# Patient Record
Sex: FEMALE | URBAN - METROPOLITAN AREA
[De-identification: names, ages, dates, MRNs, and addresses within clinical notes are randomized per-mention and may not be internally consistent; named-entity substitution may affect disease eponyms.]

---

## 2022-03-20 PROBLEM — Z34.90 PREGNANCY: Status: ACTIVE | Noted: 2021-06-08

## 2023-12-06 ENCOUNTER — TELEPHONE (OUTPATIENT)
Facility: CLINIC | Age: 23
End: 2023-12-06

## 2023-12-06 NOTE — TELEPHONE ENCOUNTER
Left voicemail to call back if she wanted to schedule appt    ----- Message from Chantel Adler sent at 12/5/2023 12:25 PM EST -----  Subject: Appointment Request    Reason for Call: New Patient/New to Provider Appointment needed: New   Patient Request Appointment    QUESTIONS    Reason for appointment request? No appointments available during search     Additional Information for Provider? patient would like to establish care   with any provider at location, patient was in ER on 12/4 and told has a   thyroid nodule and needs a referral to get a bioposy  ---------------------------------------------------------------------------  --------------  [address]  [phone]; OK to leave message on voicemail  ---------------------------------------------------------------------------  --------------  SCRIPT ANSWERS

## 2024-01-26 ENCOUNTER — TELEPHONE (OUTPATIENT)
Age: 24
End: 2024-01-26

## 2024-01-26 ENCOUNTER — OFFICE VISIT (OUTPATIENT)
Age: 24
End: 2024-01-26
Payer: COMMERCIAL

## 2024-01-26 VITALS
DIASTOLIC BLOOD PRESSURE: 72 MMHG | OXYGEN SATURATION: 98 % | SYSTOLIC BLOOD PRESSURE: 124 MMHG | HEART RATE: 124 BPM | RESPIRATION RATE: 16 BRPM

## 2024-01-26 DIAGNOSIS — E04.1 THYROID NODULE: Primary | ICD-10-CM

## 2024-01-26 DIAGNOSIS — E04.1 THYROID NODULE: ICD-10-CM

## 2024-01-26 DIAGNOSIS — E04.9 THYROID ENLARGEMENT: ICD-10-CM

## 2024-01-26 PROCEDURE — 99203 OFFICE O/P NEW LOW 30 MIN: CPT | Performed by: STUDENT IN AN ORGANIZED HEALTH CARE EDUCATION/TRAINING PROGRAM

## 2024-01-26 NOTE — PROGRESS NOTES
Subjective:   Makenna Banks   24 y.o.   2000     Refered by: No referring provider defined for this encounter.     New Patient Visit  Chief Compliant: thyroid mass    History of Present Illness:  Makenna Banks is a 24 y.o. female with no reported past medical history, who presents today for evaluation of thyroid mass.     Endorsed dysphagia and shortness of breath, now resolved.  Went to the ED for evaluation, work-up was negative and she was discharged.  Has family history of thyroid disease.  All women on father side had some issue with thyroid gland, some of them requiring total thyroidectomy.    Review of Systems  Consitutional: denies fever, excessive weight gain or loss.  Eyes: denies diplopia, eye pain.  Integumentary: denies new concerning skin lesions.  Ears, Nose, Mouth, Throat: denies except as per HPI.  Endocrine: denies hot or cold intolerance, increased thirst.  Respiratory: denies cough, hemoptysis, wheezing  Gastrointestinal: denies trouble swallowing, nausea, emesis, regurgitation  Musculoskeletal: denies muscle weakness or wasting  Cardiovascular: denies chest pain, shortness of breath  Neurologic: denies seizures, numbness or tingling, syncope  Hematologic: denies easy bleeding or bruising       History reviewed. No pertinent past medical history.  Past Surgical History:   Procedure Laterality Date     SECTION      TONSILLECTOMY        History reviewed. No pertinent family history.  Social History     Tobacco Use    Smoking status: Never    Smokeless tobacco: Never   Substance Use Topics    Alcohol use: Never      Prior to Admission medications    Not on File        No Known Allergies      Objective:     /72   Pulse (!) 124   Resp 16   SpO2 98%      Physical Exam:   General: Comfortable, pleasant, appears stated age   Voice: Strong, speaking in full sentences, no stridor    Face: No masses or lesions, facial strength symmetric   Ears: External ears unremarkable. Bilateral

## 2024-02-23 ENCOUNTER — HOSPITAL ENCOUNTER (OUTPATIENT)
Facility: HOSPITAL | Age: 24
Discharge: HOME | End: 2024-02-23
Attending: STUDENT IN AN ORGANIZED HEALTH CARE EDUCATION/TRAINING PROGRAM
Payer: COMMERCIAL

## 2024-02-23 DIAGNOSIS — E04.9 THYROID ENLARGEMENT: ICD-10-CM

## 2024-02-23 DIAGNOSIS — E04.1 THYROID NODULE: ICD-10-CM

## 2024-02-23 PROCEDURE — 76536 US EXAM OF HEAD AND NECK: CPT

## 2024-02-28 ENCOUNTER — TELEPHONE (OUTPATIENT)
Age: 24
End: 2024-02-28

## 2024-02-28 NOTE — TELEPHONE ENCOUNTER
----- Message from Usman Darnell MD sent at 2/28/2024 12:58 PM EST -----  Can you please let the patient know that the ultrasound shows a pretty sizable thyroid nodule, but not one that is concerning for a cancer. We can discuss next steps at the follow up visit

## 2024-03-05 ENCOUNTER — TELEPHONE (OUTPATIENT)
Age: 24
End: 2024-03-05

## 2025-04-21 ENCOUNTER — OFFICE VISIT (OUTPATIENT)
Age: 25
End: 2025-04-21
Payer: COMMERCIAL

## 2025-04-21 VITALS
WEIGHT: 290 LBS | SYSTOLIC BLOOD PRESSURE: 128 MMHG | DIASTOLIC BLOOD PRESSURE: 88 MMHG | HEIGHT: 63 IN | RESPIRATION RATE: 18 BRPM | OXYGEN SATURATION: 99 % | BODY MASS INDEX: 51.38 KG/M2 | HEART RATE: 96 BPM

## 2025-04-21 DIAGNOSIS — E04.1 THYROID NODULE: ICD-10-CM

## 2025-04-21 DIAGNOSIS — E04.9 THYROID GOITER: ICD-10-CM

## 2025-04-21 DIAGNOSIS — E04.9 THYROID GOITER: Primary | ICD-10-CM

## 2025-04-21 DIAGNOSIS — R13.10 DYSPHAGIA, UNSPECIFIED TYPE: ICD-10-CM

## 2025-04-21 PROCEDURE — 99214 OFFICE O/P EST MOD 30 MIN: CPT | Performed by: STUDENT IN AN ORGANIZED HEALTH CARE EDUCATION/TRAINING PROGRAM

## 2025-04-21 PROCEDURE — 31575 DIAGNOSTIC LARYNGOSCOPY: CPT | Performed by: STUDENT IN AN ORGANIZED HEALTH CARE EDUCATION/TRAINING PROGRAM

## 2025-04-21 RX ORDER — MEDROXYPROGESTERONE ACETATE 150 MG/ML
INJECTION, SUSPENSION INTRAMUSCULAR
COMMUNITY
Start: 2025-03-22

## 2025-04-21 NOTE — PROGRESS NOTES
Subjective:   Makenna Banks   25 y.o.   2000     Refered by: No referring provider defined for this encounter.     New Patient Visit  Chief Compliant: thyroid mass    History of Present Illness:  Makenna Banks is a 25 y.o. female with no reported past medical history, who presents today for evaluation of thyroid mass.     Endorsed dysphagia and shortness of breath, now resolved.  Went to the ED for evaluation, work-up was negative and she was discharged.  Has family history of thyroid disease.  All women on father side had some issue with thyroid gland, some of them requiring total thyroidectomy.    Interval Hx:   26:   Thyroid ultrasound shows extremely large right-sided thyroid goiter.  Patient is now endorsing some compressive symptoms with dysphagia and feels as though the mass is growing.  Patient is interested in thyroidectomy    Review of Systems  Consitutional: denies fever, excessive weight gain or loss.  Eyes: denies diplopia, eye pain.  Integumentary: denies new concerning skin lesions.  Ears, Nose, Mouth, Throat: denies except as per HPI.  Endocrine: denies hot or cold intolerance, increased thirst.  Respiratory: denies cough, hemoptysis, wheezing  Gastrointestinal: denies trouble swallowing, nausea, emesis, regurgitation  Musculoskeletal: denies muscle weakness or wasting  Cardiovascular: denies chest pain, shortness of breath  Neurologic: denies seizures, numbness or tingling, syncope  Hematologic: denies easy bleeding or bruising       Past Medical History:   Diagnosis Date    Asthma      Past Surgical History:   Procedure Laterality Date     SECTION      OTHER SURGICAL HISTORY      Tonsils removed as a child.    TONSILLECTOMY        Family History   Problem Relation Age of Onset    Diabetes Mother     Diabetes Maternal Grandmother     Thyroid Cancer Paternal Grandmother      Social History     Tobacco Use    Smoking status: Never    Smokeless tobacco: Never   Substance Use Topics

## 2025-05-01 ENCOUNTER — HOSPITAL ENCOUNTER (OUTPATIENT)
Facility: HOSPITAL | Age: 25
Discharge: HOME | End: 2025-05-01
Attending: STUDENT IN AN ORGANIZED HEALTH CARE EDUCATION/TRAINING PROGRAM
Payer: COMMERCIAL

## 2025-05-01 DIAGNOSIS — E04.1 THYROID NODULE: ICD-10-CM

## 2025-05-01 DIAGNOSIS — E04.9 THYROID GOITER: ICD-10-CM

## 2025-05-01 PROCEDURE — 70491 CT SOFT TISSUE NECK W/DYE: CPT

## 2025-05-01 PROCEDURE — 6360000004 HC RX CONTRAST MEDICATION: Performed by: STUDENT IN AN ORGANIZED HEALTH CARE EDUCATION/TRAINING PROGRAM

## 2025-05-01 RX ORDER — IOPAMIDOL 612 MG/ML
100 INJECTION, SOLUTION INTRAVASCULAR
Status: COMPLETED | OUTPATIENT
Start: 2025-05-01 | End: 2025-05-01

## 2025-05-01 RX ADMIN — IOPAMIDOL 100 ML: 612 INJECTION, SOLUTION INTRAVENOUS at 14:46

## 2025-05-08 ENCOUNTER — PREP FOR PROCEDURE (OUTPATIENT)
Age: 25
End: 2025-05-08

## 2025-05-08 DIAGNOSIS — E04.1 THYROID NODULE: ICD-10-CM

## 2025-05-08 DIAGNOSIS — E04.9 THYROID GOITER: ICD-10-CM

## 2025-05-08 PROBLEM — R13.10 DYSPHAGIA: Status: ACTIVE | Noted: 2025-05-08

## 2025-05-19 ENCOUNTER — HOSPITAL ENCOUNTER (OUTPATIENT)
Facility: HOSPITAL | Age: 25
Discharge: HOME | End: 2025-05-22
Payer: COMMERCIAL

## 2025-05-19 VITALS
HEART RATE: 85 BPM | DIASTOLIC BLOOD PRESSURE: 82 MMHG | BODY MASS INDEX: 48.59 KG/M2 | SYSTOLIC BLOOD PRESSURE: 123 MMHG | OXYGEN SATURATION: 97 % | RESPIRATION RATE: 20 BRPM | HEIGHT: 64 IN | WEIGHT: 284.61 LBS | TEMPERATURE: 97.5 F

## 2025-05-19 LAB
ABO + RH BLD: NORMAL
BASOPHILS # BLD: 0.05 K/UL (ref 0–0.1)
BASOPHILS NFR BLD: 0.5 % (ref 0–1)
BLOOD GROUP ANTIBODIES SERPL: NORMAL
DIFFERENTIAL METHOD BLD: ABNORMAL
EOSINOPHIL # BLD: 0.15 K/UL (ref 0–0.4)
EOSINOPHIL NFR BLD: 1.5 % (ref 0–7)
ERYTHROCYTE [DISTWIDTH] IN BLOOD BY AUTOMATED COUNT: 14.5 % (ref 11.5–14.5)
HCT VFR BLD AUTO: 45.3 % (ref 35–47)
HGB BLD-MCNC: 14.5 G/DL (ref 11.5–16)
IMM GRANULOCYTES # BLD AUTO: 0.04 K/UL (ref 0–0.04)
IMM GRANULOCYTES NFR BLD AUTO: 0.4 % (ref 0–0.5)
LYMPHOCYTES # BLD: 2.17 K/UL (ref 0.8–3.5)
LYMPHOCYTES NFR BLD: 22.1 % (ref 12–49)
MCH RBC QN AUTO: 25.1 PG (ref 26–34)
MCHC RBC AUTO-ENTMCNC: 32 G/DL (ref 30–36.5)
MCV RBC AUTO: 78.4 FL (ref 80–99)
MONOCYTES # BLD: 0.77 K/UL (ref 0–1)
MONOCYTES NFR BLD: 7.9 % (ref 5–13)
NEUTS SEG # BLD: 6.62 K/UL (ref 1.8–8)
NEUTS SEG NFR BLD: 67.6 % (ref 32–75)
NRBC # BLD: 0 K/UL (ref 0–0.01)
NRBC BLD-RTO: 0 PER 100 WBC
PLATELET # BLD AUTO: 236 K/UL (ref 150–400)
PMV BLD AUTO: 10 FL (ref 8.9–12.9)
RBC # BLD AUTO: 5.78 M/UL (ref 3.8–5.2)
SPECIMEN EXP DATE BLD: NORMAL
WBC # BLD AUTO: 9.8 K/UL (ref 3.6–11)

## 2025-05-19 PROCEDURE — 85025 COMPLETE CBC W/AUTO DIFF WBC: CPT

## 2025-05-19 PROCEDURE — 86901 BLOOD TYPING SEROLOGIC RH(D): CPT

## 2025-05-19 PROCEDURE — 36415 COLL VENOUS BLD VENIPUNCTURE: CPT

## 2025-05-19 PROCEDURE — 86900 BLOOD TYPING SEROLOGIC ABO: CPT

## 2025-05-19 PROCEDURE — 86850 RBC ANTIBODY SCREEN: CPT

## 2025-05-19 PROCEDURE — 93005 ELECTROCARDIOGRAM TRACING: CPT | Performed by: NURSE PRACTITIONER

## 2025-05-19 NOTE — DISCHARGE INSTRUCTIONS
[facility]                   [address] [place] [address]   PRE-ADMISSION TESTING    [phone]     Surgery Date:  Monday 6/2/25         INSTRUCTIONS BEFORE YOUR SURGERY   Arrival Time [employer] Pre-op staff will call you between 3 and 7pm the day before your surgery with your arrival time. If your surgery is on a Monday, they will call you the Friday before. If it's after 7pm the day prior to surgery and you have not received a time yet, please call [phone].   Where to Check In   Come through the Main Hospital entrance. Take the elevators on the left side of the lobby to the 2nd floor. The admitting desk will be on your right.     Please bring the following items to register:  photo ID, insurance card, co-pay if needed, medical directive, DNR, and/or POA if applicable.     Free  parking is available 7am to 5pm.   Food Drink Alcohol Marijuana    No food or drink (gum, mints, coffee, juice, etc) after midnight the night before surgery.      No alcohol (beer, wine, liquor) or marijuana 24 hours before or after surgery.           You may drink WATER ONLY up until 2 hours prior to your surgery time. Please do not      add anything to your water as this may result in your surgery being postponed.         PRE-OP Medication Instructions      MEDICATIONS TO TAKE THE MORNING OF SURGERY:   none           Medications to STOP 7 Days Before Surgery Non-Steroidal anti-inflammatory Drugs (NSAID's): for example: Ibuprofen, Advil, Motrin, Naproxen, Aleve  Aspirin and Aspirin containing products (BC Powder, Excedrin, etc.)  Weight loss and/or diabetic GLP1 medications (Wegovy, Ozempic, Semaglutide, Trulicity, Mounjaro, Zepbound, Tirzepatide, etc)  Herbal supplements, vitamins, and fish oil  Other:   Blood Thinners    Pre-op Hygiene     If CHG wash was provided to you at your PAT appointment, start the wash 3 days prior to surgery as instructed. Refer to the handout provided for

## 2025-05-20 LAB
EKG ATRIAL RATE: 82 BPM
EKG DIAGNOSIS: NORMAL
EKG P AXIS: 38 DEGREES
EKG P-R INTERVAL: 148 MS
EKG Q-T INTERVAL: 348 MS
EKG QRS DURATION: 84 MS
EKG QTC CALCULATION (BAZETT): 406 MS
EKG R AXIS: 8 DEGREES
EKG T AXIS: 29 DEGREES
EKG VENTRICULAR RATE: 82 BPM

## 2025-05-20 PROCEDURE — 93010 ELECTROCARDIOGRAM REPORT: CPT | Performed by: STUDENT IN AN ORGANIZED HEALTH CARE EDUCATION/TRAINING PROGRAM

## 2025-05-21 LAB
BACTERIA SPEC CULT: NORMAL
BACTERIA SPEC CULT: NORMAL
SERVICE CMNT-IMP: NORMAL

## 2025-05-29 ENCOUNTER — ANESTHESIA EVENT (OUTPATIENT)
Facility: HOSPITAL | Age: 25
End: 2025-05-29
Payer: COMMERCIAL

## 2025-05-30 NOTE — PERIOP NOTE
Hello,     You are scheduled to have surgery tomorrow at [facility].     We would like for you to arrive at  0530 am  We are located on the second floor, [address]. You will check-in at the registration desk located outside the elevators on the second floor prior to proceeding to [address].  Remember nothing to eat or drink after midnight. If you need to take medications the morning of surgery, please take with a few sips of water.   Wear loose, comfortable clothing and leave all your jewelry at home.   You may bring your cell phone with you.  One family member will be allowed in the pre-op area once you are dressed and your IV has been started.   You will need someone to drive you home and be with you for 24 hours post-anesthesia.     We look forward to seeing you! Call [phone] for questions after hours and [phone] between 5:30AM and 6PM.     Thanks!    [facility] ASU PREOP TEAM

## 2025-06-02 ENCOUNTER — ANESTHESIA (OUTPATIENT)
Facility: HOSPITAL | Age: 25
End: 2025-06-02
Payer: COMMERCIAL

## 2025-06-02 ENCOUNTER — HOSPITAL ENCOUNTER (OUTPATIENT)
Facility: HOSPITAL | Age: 25
LOS: 1 days | Discharge: HOME | End: 2025-06-03
Attending: STUDENT IN AN ORGANIZED HEALTH CARE EDUCATION/TRAINING PROGRAM | Admitting: STUDENT IN AN ORGANIZED HEALTH CARE EDUCATION/TRAINING PROGRAM
Payer: COMMERCIAL

## 2025-06-02 DIAGNOSIS — R13.14 PHARYNGOESOPHAGEAL DYSPHAGIA: ICD-10-CM

## 2025-06-02 DIAGNOSIS — E04.9 THYROID GOITER: Primary | ICD-10-CM

## 2025-06-02 DIAGNOSIS — E04.1 THYROID NODULE: ICD-10-CM

## 2025-06-02 LAB — HCG UR QL: NEGATIVE

## 2025-06-02 PROCEDURE — 2720000010 HC SURG SUPPLY STERILE: Performed by: STUDENT IN AN ORGANIZED HEALTH CARE EDUCATION/TRAINING PROGRAM

## 2025-06-02 PROCEDURE — C1713 ANCHOR/SCREW BN/BN,TIS/BN: HCPCS | Performed by: STUDENT IN AN ORGANIZED HEALTH CARE EDUCATION/TRAINING PROGRAM

## 2025-06-02 PROCEDURE — 6360000002 HC RX W HCPCS: Performed by: STUDENT IN AN ORGANIZED HEALTH CARE EDUCATION/TRAINING PROGRAM

## 2025-06-02 PROCEDURE — 6370000000 HC RX 637 (ALT 250 FOR IP): Performed by: OTOLARYNGOLOGY

## 2025-06-02 PROCEDURE — 7100000001 HC PACU RECOVERY - ADDTL 15 MIN: Performed by: STUDENT IN AN ORGANIZED HEALTH CARE EDUCATION/TRAINING PROGRAM

## 2025-06-02 PROCEDURE — 2580000003 HC RX 258: Performed by: STUDENT IN AN ORGANIZED HEALTH CARE EDUCATION/TRAINING PROGRAM

## 2025-06-02 PROCEDURE — 88342 IMHCHEM/IMCYTCHM 1ST ANTB: CPT

## 2025-06-02 PROCEDURE — 3600000013 HC SURGERY LEVEL 3 ADDTL 15MIN: Performed by: STUDENT IN AN ORGANIZED HEALTH CARE EDUCATION/TRAINING PROGRAM

## 2025-06-02 PROCEDURE — 81025 URINE PREGNANCY TEST: CPT

## 2025-06-02 PROCEDURE — 88307 TISSUE EXAM BY PATHOLOGIST: CPT

## 2025-06-02 PROCEDURE — 2500000003 HC RX 250 WO HCPCS: Performed by: NURSE ANESTHETIST, CERTIFIED REGISTERED

## 2025-06-02 PROCEDURE — 60220 PARTIAL REMOVAL OF THYROID: CPT | Performed by: OTOLARYNGOLOGY

## 2025-06-02 PROCEDURE — 3700000001 HC ADD 15 MINUTES (ANESTHESIA): Performed by: STUDENT IN AN ORGANIZED HEALTH CARE EDUCATION/TRAINING PROGRAM

## 2025-06-02 PROCEDURE — 3600000003 HC SURGERY LEVEL 3 BASE: Performed by: STUDENT IN AN ORGANIZED HEALTH CARE EDUCATION/TRAINING PROGRAM

## 2025-06-02 PROCEDURE — 2580000003 HC RX 258: Performed by: ANESTHESIOLOGY

## 2025-06-02 PROCEDURE — 6370000000 HC RX 637 (ALT 250 FOR IP): Performed by: STUDENT IN AN ORGANIZED HEALTH CARE EDUCATION/TRAINING PROGRAM

## 2025-06-02 PROCEDURE — 2709999900 HC NON-CHARGEABLE SUPPLY: Performed by: STUDENT IN AN ORGANIZED HEALTH CARE EDUCATION/TRAINING PROGRAM

## 2025-06-02 PROCEDURE — 60220 PARTIAL REMOVAL OF THYROID: CPT | Performed by: STUDENT IN AN ORGANIZED HEALTH CARE EDUCATION/TRAINING PROGRAM

## 2025-06-02 PROCEDURE — 6360000002 HC RX W HCPCS: Performed by: NURSE ANESTHETIST, CERTIFIED REGISTERED

## 2025-06-02 PROCEDURE — 3700000000 HC ANESTHESIA ATTENDED CARE: Performed by: STUDENT IN AN ORGANIZED HEALTH CARE EDUCATION/TRAINING PROGRAM

## 2025-06-02 PROCEDURE — 7100000000 HC PACU RECOVERY - FIRST 15 MIN: Performed by: STUDENT IN AN ORGANIZED HEALTH CARE EDUCATION/TRAINING PROGRAM

## 2025-06-02 PROCEDURE — 2580000003 HC RX 258: Performed by: NURSE ANESTHETIST, CERTIFIED REGISTERED

## 2025-06-02 RX ORDER — DIPHENHYDRAMINE HCL 25 MG
25 CAPSULE ORAL NIGHTLY PRN
Status: DISCONTINUED | OUTPATIENT
Start: 2025-06-02 | End: 2025-06-03 | Stop reason: HOSPADM

## 2025-06-02 RX ORDER — SUCCINYLCHOLINE/SOD CL,ISO/PF 100 MG/5ML
SYRINGE (ML) INTRAVENOUS
Status: DISCONTINUED | OUTPATIENT
Start: 2025-06-02 | End: 2025-06-02 | Stop reason: SDUPTHER

## 2025-06-02 RX ORDER — SODIUM CHLORIDE, SODIUM LACTATE, POTASSIUM CHLORIDE, CALCIUM CHLORIDE 600; 310; 30; 20 MG/100ML; MG/100ML; MG/100ML; MG/100ML
INJECTION, SOLUTION INTRAVENOUS CONTINUOUS
Status: DISCONTINUED | OUTPATIENT
Start: 2025-06-02 | End: 2025-06-02 | Stop reason: HOSPADM

## 2025-06-02 RX ORDER — MIDAZOLAM HYDROCHLORIDE 1 MG/ML
INJECTION, SOLUTION INTRAMUSCULAR; INTRAVENOUS
Status: DISCONTINUED | OUTPATIENT
Start: 2025-06-02 | End: 2025-06-02 | Stop reason: SDUPTHER

## 2025-06-02 RX ORDER — PROPOFOL 10 MG/ML
INJECTION, EMULSION INTRAVENOUS
Status: DISCONTINUED | OUTPATIENT
Start: 2025-06-02 | End: 2025-06-02 | Stop reason: SDUPTHER

## 2025-06-02 RX ORDER — PHENYLEPHRINE HCL IN 0.9% NACL 0.4MG/10ML
SYRINGE (ML) INTRAVENOUS
Status: DISCONTINUED | OUTPATIENT
Start: 2025-06-02 | End: 2025-06-02 | Stop reason: SDUPTHER

## 2025-06-02 RX ORDER — NALOXONE HYDROCHLORIDE 0.4 MG/ML
INJECTION, SOLUTION INTRAMUSCULAR; INTRAVENOUS; SUBCUTANEOUS PRN
Status: DISCONTINUED | OUTPATIENT
Start: 2025-06-02 | End: 2025-06-02 | Stop reason: HOSPADM

## 2025-06-02 RX ORDER — OXYCODONE AND ACETAMINOPHEN 5; 325 MG/1; MG/1
2 TABLET ORAL EVERY 4 HOURS PRN
Refills: 0 | Status: DISCONTINUED | OUTPATIENT
Start: 2025-06-02 | End: 2025-06-03 | Stop reason: HOSPADM

## 2025-06-02 RX ORDER — ROCURONIUM BROMIDE 10 MG/ML
INJECTION, SOLUTION INTRAVENOUS
Status: DISCONTINUED | OUTPATIENT
Start: 2025-06-02 | End: 2025-06-02 | Stop reason: SDUPTHER

## 2025-06-02 RX ORDER — ONDANSETRON 2 MG/ML
INJECTION INTRAMUSCULAR; INTRAVENOUS
Status: DISCONTINUED | OUTPATIENT
Start: 2025-06-02 | End: 2025-06-02 | Stop reason: SDUPTHER

## 2025-06-02 RX ORDER — MIDAZOLAM HYDROCHLORIDE 2 MG/2ML
2 INJECTION, SOLUTION INTRAMUSCULAR; INTRAVENOUS
Status: DISCONTINUED | OUTPATIENT
Start: 2025-06-02 | End: 2025-06-02 | Stop reason: HOSPADM

## 2025-06-02 RX ORDER — LIDOCAINE HYDROCHLORIDE 20 MG/ML
INJECTION, SOLUTION EPIDURAL; INFILTRATION; INTRACAUDAL; PERINEURAL
Status: DISCONTINUED | OUTPATIENT
Start: 2025-06-02 | End: 2025-06-02 | Stop reason: SDUPTHER

## 2025-06-02 RX ORDER — ONDANSETRON 4 MG/1
4 TABLET, ORALLY DISINTEGRATING ORAL EVERY 8 HOURS PRN
Status: DISCONTINUED | OUTPATIENT
Start: 2025-06-02 | End: 2025-06-03 | Stop reason: HOSPADM

## 2025-06-02 RX ORDER — LIDOCAINE HYDROCHLORIDE 10 MG/ML
1 INJECTION, SOLUTION EPIDURAL; INFILTRATION; INTRACAUDAL; PERINEURAL
Status: DISCONTINUED | OUTPATIENT
Start: 2025-06-02 | End: 2025-06-02 | Stop reason: HOSPADM

## 2025-06-02 RX ORDER — LIDOCAINE HYDROCHLORIDE AND EPINEPHRINE 10; 10 MG/ML; UG/ML
INJECTION, SOLUTION INFILTRATION; PERINEURAL PRN
Status: DISCONTINUED | OUTPATIENT
Start: 2025-06-02 | End: 2025-06-02 | Stop reason: HOSPADM

## 2025-06-02 RX ORDER — FENTANYL CITRATE 50 UG/ML
100 INJECTION, SOLUTION INTRAMUSCULAR; INTRAVENOUS
Status: DISCONTINUED | OUTPATIENT
Start: 2025-06-02 | End: 2025-06-02 | Stop reason: HOSPADM

## 2025-06-02 RX ORDER — FENTANYL CITRATE 50 UG/ML
INJECTION, SOLUTION INTRAMUSCULAR; INTRAVENOUS
Status: DISCONTINUED | OUTPATIENT
Start: 2025-06-02 | End: 2025-06-02 | Stop reason: SDUPTHER

## 2025-06-02 RX ORDER — ACETAMINOPHEN 325 MG/1
650 TABLET ORAL EVERY 4 HOURS PRN
Status: DISCONTINUED | OUTPATIENT
Start: 2025-06-02 | End: 2025-06-03 | Stop reason: HOSPADM

## 2025-06-02 RX ORDER — DIPHENHYDRAMINE HYDROCHLORIDE 50 MG/ML
12.5 INJECTION, SOLUTION INTRAMUSCULAR; INTRAVENOUS
Status: DISCONTINUED | OUTPATIENT
Start: 2025-06-02 | End: 2025-06-02 | Stop reason: HOSPADM

## 2025-06-02 RX ORDER — ONDANSETRON 2 MG/ML
4 INJECTION INTRAMUSCULAR; INTRAVENOUS
Status: DISCONTINUED | OUTPATIENT
Start: 2025-06-02 | End: 2025-06-02 | Stop reason: HOSPADM

## 2025-06-02 RX ORDER — OXYCODONE AND ACETAMINOPHEN 5; 325 MG/1; MG/1
1 TABLET ORAL EVERY 4 HOURS PRN
Refills: 0 | Status: DISCONTINUED | OUTPATIENT
Start: 2025-06-02 | End: 2025-06-03 | Stop reason: HOSPADM

## 2025-06-02 RX ORDER — DEXAMETHASONE SODIUM PHOSPHATE 4 MG/ML
INJECTION, SOLUTION INTRA-ARTICULAR; INTRALESIONAL; INTRAMUSCULAR; INTRAVENOUS; SOFT TISSUE
Status: DISCONTINUED | OUTPATIENT
Start: 2025-06-02 | End: 2025-06-02 | Stop reason: SDUPTHER

## 2025-06-02 RX ADMIN — MIDAZOLAM HYDROCHLORIDE 3 MG: 1 INJECTION, SOLUTION INTRAMUSCULAR; INTRAVENOUS at 07:32

## 2025-06-02 RX ADMIN — PROPOFOL 50 MG: 10 INJECTION, EMULSION INTRAVENOUS at 07:50

## 2025-06-02 RX ADMIN — Medication 3 MG: at 21:17

## 2025-06-02 RX ADMIN — MIDAZOLAM HYDROCHLORIDE 2 MG: 1 INJECTION, SOLUTION INTRAMUSCULAR; INTRAVENOUS at 07:35

## 2025-06-02 RX ADMIN — Medication 80 MCG: at 08:23

## 2025-06-02 RX ADMIN — PROPOFOL 50 MG: 10 INJECTION, EMULSION INTRAVENOUS at 09:12

## 2025-06-02 RX ADMIN — SODIUM CHLORIDE, POTASSIUM CHLORIDE, SODIUM LACTATE AND CALCIUM CHLORIDE: 600; 310; 30; 20 INJECTION, SOLUTION INTRAVENOUS at 09:50

## 2025-06-02 RX ADMIN — ROCURONIUM BROMIDE 10 MG: 50 INJECTION INTRAVENOUS at 07:40

## 2025-06-02 RX ADMIN — OXYCODONE HYDROCHLORIDE AND ACETAMINOPHEN 1 TABLET: 5; 325 TABLET ORAL at 21:17

## 2025-06-02 RX ADMIN — CEFAZOLIN 3000 MG: 3 INJECTION, POWDER, FOR SOLUTION INTRAVENOUS at 07:50

## 2025-06-02 RX ADMIN — PROPOFOL 50 MCG/KG/MIN: 10 INJECTION, EMULSION INTRAVENOUS at 08:12

## 2025-06-02 RX ADMIN — LIDOCAINE HYDROCHLORIDE 60 MG: 20 INJECTION, SOLUTION EPIDURAL; INFILTRATION; INTRACAUDAL; PERINEURAL at 07:40

## 2025-06-02 RX ADMIN — FENTANYL CITRATE 50 MCG: 50 INJECTION, SOLUTION INTRAMUSCULAR; INTRAVENOUS at 07:32

## 2025-06-02 RX ADMIN — DEXAMETHASONE SODIUM PHOSPHATE 8 MG: 4 INJECTION, SOLUTION INTRAMUSCULAR; INTRAVENOUS at 07:59

## 2025-06-02 RX ADMIN — FENTANYL CITRATE 100 MCG: 50 INJECTION, SOLUTION INTRAMUSCULAR; INTRAVENOUS at 07:40

## 2025-06-02 RX ADMIN — OXYCODONE HYDROCHLORIDE AND ACETAMINOPHEN 1 TABLET: 5; 325 TABLET ORAL at 13:02

## 2025-06-02 RX ADMIN — FENTANYL CITRATE 100 MCG: 50 INJECTION, SOLUTION INTRAMUSCULAR; INTRAVENOUS at 07:49

## 2025-06-02 RX ADMIN — DIPHENHYDRAMINE HYDROCHLORIDE 25 MG: 25 CAPSULE ORAL at 21:16

## 2025-06-02 RX ADMIN — Medication 200 MCG: at 09:30

## 2025-06-02 RX ADMIN — PHENYLEPHRINE HYDROCHLORIDE 100 MCG/MIN: 10 INJECTION INTRAVENOUS at 08:25

## 2025-06-02 RX ADMIN — Medication 120 MCG: at 08:01

## 2025-06-02 RX ADMIN — PROPOFOL 150 MG: 10 INJECTION, EMULSION INTRAVENOUS at 07:40

## 2025-06-02 RX ADMIN — Medication 120 MCG: at 08:08

## 2025-06-02 RX ADMIN — ONDANSETRON 4 MG: 2 INJECTION, SOLUTION INTRAMUSCULAR; INTRAVENOUS at 09:50

## 2025-06-02 RX ADMIN — Medication 160 MG: at 07:40

## 2025-06-02 RX ADMIN — SODIUM CHLORIDE, POTASSIUM CHLORIDE, SODIUM LACTATE AND CALCIUM CHLORIDE: 600; 310; 30; 20 INJECTION, SOLUTION INTRAVENOUS at 07:09

## 2025-06-02 RX ADMIN — Medication 80 MCG: at 07:56

## 2025-06-02 ASSESSMENT — PAIN DESCRIPTION - LOCATION
LOCATION: THROAT
LOCATION: NECK
LOCATION: NECK

## 2025-06-02 ASSESSMENT — PAIN DESCRIPTION - PAIN TYPE
TYPE: SURGICAL PAIN

## 2025-06-02 ASSESSMENT — PAIN SCALES - GENERAL
PAINLEVEL_OUTOF10: 3
PAINLEVEL_OUTOF10: 3
PAINLEVEL_OUTOF10: 0
PAINLEVEL_OUTOF10: 6
PAINLEVEL_OUTOF10: 4
PAINLEVEL_OUTOF10: 3

## 2025-06-02 ASSESSMENT — PAIN DESCRIPTION - DESCRIPTORS
DESCRIPTORS: ACHING
DESCRIPTORS: ACHING;SORE
DESCRIPTORS: SORE

## 2025-06-02 ASSESSMENT — PAIN - FUNCTIONAL ASSESSMENT
PAIN_FUNCTIONAL_ASSESSMENT: ACTIVITIES ARE NOT PREVENTED
PAIN_FUNCTIONAL_ASSESSMENT: 0-10

## 2025-06-02 NOTE — ANESTHESIA PRE PROCEDURE
Department of Anesthesiology  Preprocedure Note       Name:  Makenna Banks   Age:  25 y.o.  :  2000                                          MRN:  [id number]         Date:  2025      Surgeon: Surgeon(s):  Usman Darnell MD Raval, Tejas, MD    Procedure: Procedure(s):  RIGHT HEMITHYROIDECTOMY, POSSIBLE SUBSTERNAL THYROIDECTOMY    Medications prior to admission:   Prior to Admission medications    Medication Sig Start Date End Date Taking? Authorizing Provider   medroxyPROGESTERone (DEPO-PROVERA) 150 MG/ML injection INJECT 1 MILLILITER (150 MG) BY INTRAMUSCULAR ROUTE EVERY 3 MONTHS FOR 90 DAYS 3/22/25   Provider, MD Vania   acetaminophen (TYLENOL) 500 MG CAPS capsule Take 2 capsules by mouth every 4 hours as needed    Automatic Reconciliation, Ar       Current medications:    Current Facility-Administered Medications   Medication Dose Route Frequency Provider Last Rate Last Admin   • lidocaine PF 1 % injection 1 mL  1 mL IntraDERmal Once PRN Vivek Hernández MD       • fentaNYL (SUBLIMAZE) injection 100 mcg  100 mcg IntraVENous Once PRN Vivek Hernández MD       • lactated ringers infusion   IntraVENous Continuous Vivek Hernández MD       • midazolam PF (VERSED) injection 2 mg  2 mg IntraVENous Once PRN Vivek Hernández MD       • ceFAZolin (ANCEF) 3,000 mg in sodium chloride 0.9 % 100 mL (addEASE)  3,000 mg IntraVENous Once Usman Darnell MD           Allergies:  No Known Allergies    Problem List:    Patient Active Problem List   Diagnosis Code   • Pregnancy Z34.90   • Thyroid goiter E04.9   • Thyroid nodule E04.1   • Dysphagia R13.10       Past Medical History:        Diagnosis Date   • Asthma    • PONV (postoperative nausea and vomiting)    • Thyroid nodule        Past Surgical History:        Procedure Laterality Date   •  SECTION     • TONSILLECTOMY  2009       Social History:    Social History     Tobacco Use   • Smoking status: Some Days     Types: Cigarettes   • Smokeless tobacco:

## 2025-06-02 NOTE — OP NOTE
Operative Note      Patient: Makenna Banks  YOB: 2000  MRN: [id number]    Date of Procedure: 6/2/2025    Pre-Op Diagnosis Codes:      * Thyroid goiter [E04.9]     * Thyroid nodule [E04.1]     * Dysphagia, unspecified type [R13.10]    Post-Op Diagnosis: Same       Procedure(s):  RIGHT HEMITHYROIDECTOMY    Surgeon(s):  Usman Darnell MD Raval, Tejas, MD    Assistant:   Surgical Assistant: Arianna Bui    Anesthesia: General    Estimated Blood Loss (mL): less than 100     Complications: None    Specimens:   ID Type Source Tests Collected by Time Destination   1 : Right thyroid Tissue Neck SURGICAL PATHOLOGY Usman Darnell MD 6/2/2025 0934        Implants:  * No implants in log *      Drains:   Closed/Suction Drain Right;Lateral Neck Bulb (Active)   Site Description Clean, dry & intact 06/02/25 1052   Dressing Status Clean, dry & intact 06/02/25 1052   Drainage Appearance Bloody 06/02/25 1052   Drain Status Compressed;To bulb suction 06/02/25 1052   Output (ml) 0 ml 06/02/25 1052       Findings:  Infection Present At Time Of Surgery (PATOS) (choose all levels that have infection present):  No infection present  Other Findings:   1) large right-sided thyroid nodule encompassing majority of the right side of the thyroid gland  2) identification and preservation of right recurrent laryngeal nerve  3) identification and preservation of the inferior and superior parathyroid glands on the right side    Detailed Description of Procedure:   Patient is brought to the operating room placed supine on the table.  General endotracheal anesthesia was obtained and a timeout was performed.  Nerve integrity monitor endotracheal tube was utilized for intraoperative recurrent laryngeal nerve monitoring.  IV Ancef is given.  The patient is positioned in the appropriate fashion for thyroidectomy with a shoulder roll and neck extension.  The anterior neck skin is prepped with alcohol and the proposed incision site is

## 2025-06-02 NOTE — H&P
3.2 x 4 point cm.  The left thyroid lobe  measures 4.4 x 1.3 x 1.4 cm. The thyroid isthmus measures 3 mm in thickness.  There is a large mixed cystic and solid well marginated thyroid lesion of the  right thyroid measuring 5.1 x 3.2 x 3.6 cm.    CT Neck:   IMPRESSION:     1. There is a 3.9 x 4 x 6.2 cm partially cystic dominant right thyroid nodule  with mild deviation of the trachea to the left. The trachea is slightly narrowed  in transverse dimension measuring 1 cm. There is no substernal extension. No  adenopathy is identified..    FLEXIBLE FIBEROPTIC LARYNGOSCOPY (4/21/25)  Findings:   Nasal Cavity: Normal nasal cavity, no polyposis or purulence.  Middle meatus clear bilaterally.   Nasopharynx: No overt masses or lesions.   Base of tongue: Vallecula & epiglottis unremarkable. Clear pyriform sinus.   TVC: Vocal folds without lesions. Normal mobility.   Subglottis: Visualized subglottis appears patent.   Some mild mass effect on the larynx with right-sided thyroid goiter, slight deviation to the left side    Assessment/Plan:   Assessment:   Makenna Banks is a 25 y.o. female with right-sided thyroid nodule, > 5cm.  Now with compressive symptoms    Plan:   CT Neck reviewed - slight deviation of trachea. No substernal extension  Will plan for right-sided hemithyroidectomy, possible substernal   Risks and benefits of surgery discussed with patient.  Risks include bleeding, hematoma formation, seroma formation, need for revision surgery, injury to recurrent laryngeal nerve, hypocalcemia, injury to surrounding structures including trachea or esophagus, scar formation, and risks associated with anesthesia    Orders Placed This Encounter    Vital signs per unit routine     Standing Status:   Standing     Number of Occurrences:   1    Notify provider for     Notify provider for pulse less than 40 or greater than 120, respiratory rate less than 12 or greater than 25, temperature greater than 101.3 F (38.5 C), urinary

## 2025-06-02 NOTE — OP NOTE
Operative Note    Patient: Makenna Banks  YOB: 2000  MRN: [id number]    Date of Procedure: 6/2/25     Pre-Op Diagnosis: Thyroid goiter [E04.9]  Thyroid nodule [E04.1]  Dysphagia, unspecified type [R13.10]    Post-Op Diagnosis: Same as preoperative diagnosis.      Procedure(s):  RIGHT HEMITHYROIDECTOMY    Surgeon(s):  Usman Darnell MD Raval, Tejas, MD    Surgical Assistant: Surgical Assistant: Arianna Bui    Anesthesia: General     Estimated Blood Loss (mL):  less than 50     Complications: None    Specimens:   ID Type Source Tests Collected by Time Destination   1 : Right thyroid Tissue Neck SURGICAL PATHOLOGY Usman Darnell MD 6/2/2025 0934         Implants: * No implants in log *    Drains:   Closed/Suction Drain Right;Lateral Neck Bulb (Active)       Findings: Refer to primary surgeon operative note    Indications: Refer to primary surgeon operative note    Detailed Description of Procedure:     I assisted in this case and helped perform key portions of the procedure including dissection of the right thyroid lobe, identification, dissection, and preservation of the right recurrent laryngeal nerve.    Electronically Signed by Romeo Vazquez MD on 6/2/2025 at 10:13 AM

## 2025-06-02 NOTE — PERIOP NOTE
TRANSFER - OUT REPORT:    Verbal report given to Nataliia LUCIA on Makenna Banks  being transferred to [facility] for routine progression of patient care       Report consisted of patient's Situation, Background, Assessment and   Recommendations(SBAR).     Information from the following report(s) Surgery Report and MAR was reviewed with the receiving nurse.           Lines:   Peripheral IV 06/02/25 Right;Anterior Wrist (Active)   Site Assessment Clean, dry & intact 06/02/25 1121   Line Status Infusing 06/02/25 1121   Line Care Connections checked and tightened 06/02/25 1121   Phlebitis Assessment No symptoms 06/02/25 1121   Infiltration Assessment 0 06/02/25 1121   Alcohol Cap Used Yes 06/02/25 1121   Dressing Status Clean, dry & intact 06/02/25 1121   Dressing Type Transparent 06/02/25 1121        Opportunity for questions and clarification was provided.      Patient transported with:  Registered Nurse

## 2025-06-02 NOTE — ANESTHESIA POSTPROCEDURE EVALUATION
Department of Anesthesiology  Postprocedure Note    Patient: Makenna Banks  MRN: [id number]  YOB: 2000  Date of evaluation: 6/2/2025    Procedure Summary       Date: 06/02/25 Room / Location: [facility] ASU OR  / [facility] AMBULATORY OR    Anesthesia Start: 0732 Anesthesia Stop: 1027    Procedure: RIGHT HEMITHYROIDECTOMY (Neck) Diagnosis:       Thyroid goiter      Thyroid nodule      Dysphagia, unspecified type      (Thyroid goiter [E04.9])      (Thyroid nodule [E04.1])      (Dysphagia, unspecified type [R13.10])    Surgeons: Usman Darnell MD Responsible Provider: Rod Jon MD    Anesthesia Type: General ASA Status: 3            Anesthesia Type: General    Wendy Phase I: Wendy Score: 10    Wendy Phase II:      Anesthesia Post Evaluation    No notable events documented.

## 2025-06-03 VITALS
HEART RATE: 71 BPM | DIASTOLIC BLOOD PRESSURE: 91 MMHG | OXYGEN SATURATION: 100 % | HEIGHT: 64 IN | SYSTOLIC BLOOD PRESSURE: 138 MMHG | WEIGHT: 280.2 LBS | BODY MASS INDEX: 47.84 KG/M2 | TEMPERATURE: 97.5 F | RESPIRATION RATE: 17 BRPM

## 2025-06-03 PROCEDURE — 6370000000 HC RX 637 (ALT 250 FOR IP): Performed by: STUDENT IN AN ORGANIZED HEALTH CARE EDUCATION/TRAINING PROGRAM

## 2025-06-03 RX ORDER — IBUPROFEN 600 MG/1
600 TABLET, FILM COATED ORAL 4 TIMES DAILY PRN
Qty: 360 TABLET | Refills: 1 | Status: SHIPPED | OUTPATIENT
Start: 2025-06-03

## 2025-06-03 RX ORDER — HYDROCODONE BITARTRATE AND ACETAMINOPHEN 5; 325 MG/1; MG/1
1 TABLET ORAL EVERY 8 HOURS PRN
Qty: 9 TABLET | Refills: 0 | Status: SHIPPED | OUTPATIENT
Start: 2025-06-03 | End: 2025-06-06

## 2025-06-03 RX ADMIN — ACETAMINOPHEN 650 MG: 325 TABLET ORAL at 04:05

## 2025-06-03 ASSESSMENT — PAIN SCALES - GENERAL
PAINLEVEL_OUTOF10: 3
PAINLEVEL_OUTOF10: 0

## 2025-06-03 ASSESSMENT — PAIN DESCRIPTION - LOCATION: LOCATION: NECK

## 2025-06-03 ASSESSMENT — PAIN DESCRIPTION - DESCRIPTORS: DESCRIPTORS: SORE

## 2025-06-03 NOTE — PLAN OF CARE
Problem: Pain  Goal: Verbalizes/displays adequate comfort level or baseline comfort level  Outcome: Progressing     Problem: Respiratory - Adult  Goal: Achieves optimal ventilation and oxygenation  Outcome: Progressing     Problem: Skin/Tissue Integrity - Adult  Goal: Incisions, wounds, or drain sites healing without S/S of infection  Outcome: Progressing     Problem: Infection - Adult  Goal: Absence of infection at discharge  Outcome: Progressing

## 2025-06-03 NOTE — DISCHARGE INSTRUCTIONS
You can shower 24 hours after removal of the drain, but keep bandaid on the site. Avoid removing dressing, I will take it off at the follow up visit.   Take tylenol or ibuprofen for pain control. Can you use prescribed opioid if pain is not controlled with those meds.   Follow up in the office in 1 week. Will remove sutures at that time.     If you have any numbness or tingling, it can be a sign of hypocalcemia or low calcium levels.  Take calcium carbonate(Tums) tablets.  If the numbness or tingling persists, come to the emergency room to check her calcium levels    Come to the ED if you have signficiant neck swelling or shortness of breath.  Call the office and come to the ED if you have fevers greater than 102 degrees

## 2025-06-03 NOTE — PROGRESS NOTES
[facility] Progress Note    Patient: Makenna Banks MRN: [id number]  SSN: [id number]    YOB: 2000  Age: 25 y.o.  Sex: female      Admit Date: 6/2/2025    LOS: 1 day     Subjective:     POD 1 s/p right hemithyroidectomy.  Doing very well postoperatively.  Voice strong.  Swallowing well.  No significant issues.  Pain well-controlled    Objective:     Vitals:    06/03/25 0019 06/03/25 0035 06/03/25 0400 06/03/25 0743   BP: 134/67  100/67 (!) 138/91   Pulse: 78  77 71   Resp: 16  15 17   Temp: 98.1 °F (36.7 °C)  98.4 °F (36.9 °C) 97.5 °F (36.4 °C)   TempSrc: Oral  Oral Oral   SpO2: 97%  97% 100%   Weight:       Height:  1.613 m (5' 3.5\")        Intake and Output:  Current Shift: No intake/output data recorded.  Last three shifts: 06/01 1901 - 06/03 0700  In: 1100 [I.V.:1000]  Out: 38 [Drains:38]    Physical Exam:   Anterior neck incision clean dry intact.  7 EARL in place with serosanguineous drainage -Dc'd  Voice strong, tolerating p.o. well    Lab/Data Review:    No results found for this or any previous visit (from the past 24 hours).     Assessment:     Principal Problem:    Thyroid goiter  Active Problems:    Thyroid nodule    Dysphagia  Resolved Problems:    * No resolved hospital problems. *      Plan:   25F with large right-sided thyroid goiter secondary to large nodule.  With compressive symptoms including shortness of breath and dysphagia.  Some deviation of trachea noted on imaging.    S/p right hemithyroidectomy    -Cleared for discharge home  - EARL drain removed  - Follow-up in 1 week for removal of suture    Signed By: Usman Darnell MD     Lary 3, 2025      Usman Darnell MD   [facility]  [address]  [place] [address]  Office Phone: [phone]

## 2025-06-03 NOTE — CARE COORDINATION
Care Management Progress Note    Reason for Admission:   Thyroid goiter [E04.9]  Thyroid nodule [E04.1]  Dysphagia, unspecified type [R13.10]  Procedure(s) (LRB):  RIGHT HEMITHYROIDECTOMY (N/A)  1 Day Post-Op    Patient Admission Status: Outpatient in a bed  RUR:  4%  Hospitalization in the last 30 days (Readmission):  No        Transition Plan of Care:  ENT following for medical management - pt is s/p right hemithyroidectomy on 6/2  Plan is for pt to return home  Outpatient follow up  Pt will arrange her own transport home    No discharge needs indicated.  Melly

## 2025-06-03 NOTE — DISCHARGE SUMMARY
Discharge Summary:     Makenna Banks  [id number]  2000    Patient admitted for right hemithyroidectomy on 6/2/25. Procedure completed without complication. Patient seen in the recovery room - resting comfortably. Cleared for d/c home once criteria have been met.

## 2025-06-03 NOTE — PROGRESS NOTES
Discharge paperwork provided to patient at bedside. All questions answered and patient verbalized understanding.

## 2025-06-05 ENCOUNTER — RESULTS FOLLOW-UP (OUTPATIENT)
Age: 25
End: 2025-06-05

## 2025-06-05 NOTE — TELEPHONE ENCOUNTER
----- Message from Dr. Usman Darnell MD sent at 6/5/2025  2:32 PM EDT -----  Can you please update the patient that the pathology shows a large follicular adenoma -not a cancer, but an abnormal growth of the thyroid gland.      No further treatment is required.     She should follow-up as scheduled next week to remove the stitches.

## 2025-06-05 NOTE — TELEPHONE ENCOUNTER
Spoke with patient in ref to results. Patient voiced understanding and all questions answered. Appt confirmed for next week.

## 2025-06-09 ENCOUNTER — OFFICE VISIT (OUTPATIENT)
Age: 25
End: 2025-06-09

## 2025-06-09 VITALS — HEART RATE: 130 BPM | OXYGEN SATURATION: 99 %

## 2025-06-09 DIAGNOSIS — E04.1 THYROID NODULE: Primary | ICD-10-CM

## 2025-06-09 PROCEDURE — 99024 POSTOP FOLLOW-UP VISIT: CPT | Performed by: OTOLARYNGOLOGY

## 2025-06-09 NOTE — PROGRESS NOTES
Presents today 1 week postop right thyroid lobectomy.  She is doing well overall    Voice clear  Neck is dressed with a Tegaderm over Steri-Strips  Tegaderm and Steri-Strips removed, Prolene subcuticular suture removed  Incision clean dry intact    Pathology is follicular adenoma    Incisional care discussed  Follow-up with Dr Darnell as scheduled

## 2025-08-04 ENCOUNTER — OFFICE VISIT (OUTPATIENT)
Age: 25
End: 2025-08-04

## 2025-08-04 VITALS — OXYGEN SATURATION: 99 % | HEART RATE: 95 BPM | DIASTOLIC BLOOD PRESSURE: 82 MMHG | SYSTOLIC BLOOD PRESSURE: 126 MMHG

## 2025-08-04 DIAGNOSIS — E04.1 THYROID NODULE: Primary | ICD-10-CM

## 2025-08-04 DIAGNOSIS — E04.9 THYROID GOITER: ICD-10-CM

## 2025-08-04 DIAGNOSIS — E89.0 S/P PARTIAL THYROIDECTOMY: ICD-10-CM

## 2025-08-04 PROCEDURE — 99024 POSTOP FOLLOW-UP VISIT: CPT | Performed by: STUDENT IN AN ORGANIZED HEALTH CARE EDUCATION/TRAINING PROGRAM

## (undated) DEVICE — SOLUTION IRRIG 1000ML 09% SOD CHL USP PIC PLAS CONTAINER

## (undated) DEVICE — SUTURE MONOCRYL SZ 5-0 L18IN ABSRB UD L19MM PS-2 3/8 CIR PRIM Y495G

## (undated) DEVICE — SUTURE PERMAHAND SZ 2-0 L30IN NONABSORBABLE BLK SILK W/O A305H

## (undated) DEVICE — PROBE 8225101 5PK STD PRASS FL TIP ROHS

## (undated) DEVICE — SUTURE VICRYL SZ 3-0 L18IN ABSRB UD L26MM SH 1/2 CIR J864D

## (undated) DEVICE — SUTURE PERMAHAND SZ 3-0 L18IN NONABSORBABLE BLK L26MM SH C013D

## (undated) DEVICE — SUTURE PROL SZ 5-0 L18IN NONABSORBABLE BLU L13MM P-3 3/8 8698G

## (undated) DEVICE — MAGNETIC INSTR DRAPE 20X16: Brand: MEDLINE INDUSTRIES, INC.

## (undated) DEVICE — STAPLER SKIN H3.9MM WIRE DIA0.58MM CRWN 6.9MM 35 STPL FIX

## (undated) DEVICE — AGENT HEMSTAT W4XL4IN OXIDIZED REGENERATED CELOS STRUCTURED

## (undated) DEVICE — LIQUIBAND RAPID ADHESIVE 36/CS 0.8ML: Brand: MEDLINE

## (undated) DEVICE — SPONGE,PEANUT,XRAY,ST,SM,3/8",5/CARD: Brand: MEDLINE INDUSTRIES, INC.

## (undated) DEVICE — APPLIER CLP M L11IN TI MULT RNG HNDL 30 CLP STR LIGACLP

## (undated) DEVICE — POUCH INSTR W6.75XL11.5IN FRST 2 PKT ADH FOR ORTH AND

## (undated) DEVICE — SPONGE: SPECIALTY PEANUT XR 100/CS: Brand: MEDICAL ACTION INDUSTRIES

## (undated) DEVICE — RESERVOIR,SUCTION,100CC,SILICONE: Brand: MEDLINE

## (undated) DEVICE — MINOR ENT-SFMCASU: Brand: MEDLINE INDUSTRIES, INC.

## (undated) DEVICE — SPONGE GZ W4XL4IN COT RADPQ HIGHLY ABSRB STERILE

## (undated) DEVICE — PENCIL ES CRD L10FT HND SWCHING ROCK SWCH W/ EDGE COAT BLDE

## (undated) DEVICE — SOLUTION IRRIG 1000ML H2O PIC PLAS SHATTERPROOF CONTAINER

## (undated) DEVICE — GLOVE ORANGE PI 7   MSG9070

## (undated) DEVICE — 3M™ TEGADERM™ TRANSPARENT FILM DRESSING FRAME STYLE, 1624W, 2-3/8 IN X 2-3/4 IN (6 CM X 7 CM), 100/CT 4CT/CASE: Brand: 3M™ TEGADERM™

## (undated) DEVICE — BLADE ES ELASTOMERIC COAT INSUL DURABLE BEND UPTO 90DEG

## (undated) DEVICE — SEALER LAP SM L18.8CM OPN JAW HAND/FOOT SWCH FORCETRIAD

## (undated) DEVICE — DRAIN SURG FLAT W7MMXL20CM FULL PERF

## (undated) DEVICE — STRIP,CLOSURE,WOUND,MEDI-STRIP,1/2X4: Brand: MEDLINE